# Patient Record
Sex: FEMALE | Race: WHITE | HISPANIC OR LATINO | Employment: FULL TIME | URBAN - METROPOLITAN AREA
[De-identification: names, ages, dates, MRNs, and addresses within clinical notes are randomized per-mention and may not be internally consistent; named-entity substitution may affect disease eponyms.]

---

## 2017-06-07 ENCOUNTER — TELEPHONE (OUTPATIENT)
Dept: ALLERGY | Facility: CLINIC | Age: 62
End: 2017-06-07

## 2017-06-07 ENCOUNTER — HOSPITAL ENCOUNTER (OUTPATIENT)
Dept: RADIOLOGY | Facility: HOSPITAL | Age: 62
Discharge: HOME | End: 2017-06-07
Attending: ALLERGY & IMMUNOLOGY
Payer: COMMERCIAL

## 2017-06-07 ENCOUNTER — OFFICE VISIT (OUTPATIENT)
Dept: ALLERGY | Facility: CLINIC | Age: 62
End: 2017-06-07
Payer: COMMERCIAL

## 2017-06-07 VITALS
SYSTOLIC BLOOD PRESSURE: 110 MMHG | HEART RATE: 70 BPM | RESPIRATION RATE: 15 BRPM | DIASTOLIC BLOOD PRESSURE: 60 MMHG | TEMPERATURE: 98 F | HEIGHT: 62 IN | WEIGHT: 142 LBS | BODY MASS INDEX: 26.13 KG/M2

## 2017-06-07 DIAGNOSIS — R51.9 PAIN OF SCALP: ICD-10-CM

## 2017-06-07 DIAGNOSIS — R51.9 CHRONIC NONINTRACTABLE HEADACHE, UNSPECIFIED HEADACHE TYPE: ICD-10-CM

## 2017-06-07 DIAGNOSIS — G89.29 CHRONIC NONINTRACTABLE HEADACHE, UNSPECIFIED HEADACHE TYPE: ICD-10-CM

## 2017-06-07 DIAGNOSIS — H69.93 EUSTACHIAN TUBE DYSFUNCTION, BILATERAL: ICD-10-CM

## 2017-06-07 DIAGNOSIS — H93.13 TINNITUS, BILATERAL: ICD-10-CM

## 2017-06-07 DIAGNOSIS — J30.89 ALLERGIC RHINITIS DUE TO OTHER ALLERGEN: ICD-10-CM

## 2017-06-07 DIAGNOSIS — J30.89 ALLERGIC RHINITIS DUE TO OTHER ALLERGEN: Primary | ICD-10-CM

## 2017-06-07 PROCEDURE — 70220 X-RAY EXAM OF SINUSES: CPT | Mod: TC,PO

## 2017-06-07 PROCEDURE — 99999 PR PBB SHADOW E&M-EST. PATIENT-LVL III: CPT | Mod: PBBFAC,,, | Performed by: ALLERGY & IMMUNOLOGY

## 2017-06-07 PROCEDURE — 99215 OFFICE O/P EST HI 40 MIN: CPT | Mod: S$GLB,,, | Performed by: ALLERGY & IMMUNOLOGY

## 2017-06-07 PROCEDURE — 70220 X-RAY EXAM OF SINUSES: CPT | Mod: 26,,, | Performed by: RADIOLOGY

## 2017-06-07 RX ORDER — FLUTICASONE PROPIONATE 50 MCG
SPRAY, SUSPENSION (ML) NASAL
Qty: 16 G | Refills: 8 | Status: SHIPPED | OUTPATIENT
Start: 2017-06-07

## 2017-06-07 RX ORDER — PREDNISONE 5 MG/1
TABLET ORAL
Qty: 26 TABLET | Refills: 0 | Status: SHIPPED | OUTPATIENT
Start: 2017-06-07

## 2017-06-07 NOTE — PROGRESS NOTES
Chief complaint: Sinus problems, tinnitus    This note was dictated using voice recognition software may contain errors.    History:    She had a 9 AM appointment on Wednesday, June 7, 2017.  Information in her medical record regarding her past medical history family history and social history was reviewed and updated today.  Significant additions if any are as noted below.  At the time of her appointment with me this morning I did not have access to her entire [facility] medical record.    Her last appointment with me occurred October 23, 2014.  I reviewed information in that note.  Please refer to it.  My last communication with her occurred November 5, 2014.  Please refer to that note.  Information in that note was reviewed.    In years past diagnostic immediate hypersensitivity prick and intradermal skin testing was performed February 27, 2002.  Prick skin tests were performed on October 23, 2014.  Diagnostic laboratory tests were also performed October 23, 2014.  Please refer to the results of those tests.    She stated she is scheduled her appointment with me today because of extremely troublesome symptoms including headache, sinus pressure, symptoms of eustachian tube dysfunction and pain of the scalp.  In years past she is had TM J surgery.  She does not recall which joint had the surgical procedure.  That surgery was performed about 20 years ago.  She is had sinus surgery upon 2 occasions.  She is seeing ENT specialty surgeons regarding her tinnitus.  She has seen .    Review of systems: See above.  She is not experienced any fever.  She is not experienced any visual changes in is not had any loss of vision.  She is experiencing bilateral tinnitus.  She mentioned that she had started omitting gluten from her diet.  Since doing so she stated that it is her perception she feels better.    Exam:    In general she is in no distress.  She is alert and oriented well-developed in good mood and  attentive.  She is not toxic.  Gait steady  Skin no rash noted  Head no swelling noted.  Temporal artery pulses are equal bilaterally  Eyes sclera white conjunctiva pink  Nose patent no polyps seen pink mucosa clear secretions  Ears not inflamed tympanic membranes not inflamed  Mouth no swelling or inflammation of the lips tongue or in the throat noted  Neck no masses or thyromegaly noted  Lungs clear to auscultation  Lymph nodes no significant cervical or epitrochlear lymphadenopathy noted  Heart regular rhythm no murmurs heard  Extremities no swelling or inflammation of the hands or legs noted    Impression:    #1 headache, exact cause uncertain, occurring in an individual with a history of TM joint surgery and sinus surgery  #2 scalp pain, cause uncertain  #3 chronic rhinitis, history of ALLERGIC rhinitis  #4 eustachian tube dysfunction  #5 tinnitus, bilateral cause uncertain    Assessment and plan:    I have recommended that sinus x-rays be performed to assess whether or not she may have acute sinusitis at this time.  I recommended that laboratory tests be performed.  We had a discussion regarding temporal arteritis.  Arrangements were made to have her x-rays and lab tests performed after her appointment with me this morning.  When the results are available to review with her I will contact her and do so.    We discussed options for treatment.  After blood is been drawn, she will begin taking a short tapering course of prednisone starting after lunch today.  At her request a prescription for prednisone 5 mg, 26 pills with no refills was issued and was transmitted electronically to her pharmacy.  She will take 30 mg for 2 days followed by 20 mg for 2 days and 10 mg for 2 days then 5 mg for 2 days and then stopped taking it.  Potential side effects of steroid therapy were reviewed.    At her request she was given a printed prescription for Flonase.    She was given the office phone number.  Should she have  additional questions or concerns she was instructed to call.    I suggested that additional recommendations may be made based upon the results of her diagnostic tests and also her response or lack of response to treatment.    Her appointment was 45 minutes in duration spent entirely in face-to-face contact.  More than 50% of the visit was spent in counseling and coordination of care.

## 2017-06-07 NOTE — TELEPHONE ENCOUNTER
This note was dictated using voice recognition software and may contain errors.    I called her shortly before 4 PM on  and spoke with her.  I informed her that her sedimentation rate was normal at a value of 1.  Her sinus x-rays were interpreted as revealing no evidence of air fluid levels her sinusitis.    She stated that she is concerned regarding her symptoms impacting her had and how she feels.  I told her that it would be important not to overlooked migraine headache phenomena.  She mentioned that she has a sister who  of a ruptured cerebral aneurysm.    When the remainder of the laboratory tests which I ordered have returned I will contact her and review the results with her.  Should she continue to be symptomatic I told her it is my recommendation that she obtain neurology consultation.

## 2017-06-09 ENCOUNTER — TELEPHONE (OUTPATIENT)
Dept: ALLERGY | Facility: CLINIC | Age: 62
End: 2017-06-09

## 2017-06-09 NOTE — TELEPHONE ENCOUNTER
This note was dictated using voice recognition software and may contain errors.    I spoke with her on the telephone about 3:25 PM on Friday, June 9, 2017.  She had an appointment with me on June 7.  I reviewed with her the results of the laboratory tests which I ordered and also the sinus x-ray report.  Results are normal negative or satisfactory.  At her request she will be provided with copies of the results and the report.  This information will be mailed to her.    She stated she did not obtain her prescription for prednisone until today.  She began taking prednisone this morning.  Should she not improve or have additional questions or concerns she may call.

## 2017-06-09 NOTE — TELEPHONE ENCOUNTER
----- Message from Irma Miller sent at 6/9/2017  9:38 AM CDT -----  Contact: Patient   Patient is checking on the status of her prescription she stated that she was advise by pharmacy noting has been called in for her Rx Prednisone. Please call her at [phone].      [facility]/pharmacy #[id number] - [place] - [id number] AT [facility]  [address] [id number]  Phone: [phone] Fax: [phone]    Thanks  td